# Patient Record
(demographics unavailable — no encounter records)

---

## 2024-12-11 NOTE — HISTORY OF PRESENT ILLNESS
[FreeTextEntry1] : 37 yo  with lmp 24 Patient presented to Urgent Care and +urine preg test   She was referred to ObGyn for further evaluation  2 term  2 termination of pregnancies 1 spontaneous Ab  Patient is AMA and tobacco user   Patient counseled on AMA issues History of asthma  Uses Albuterol off and on  Not currently Encouraged to dc tobacco use  Patient reports history of GDM1 with previous pregnancy  and shortening of cervix (given progesterone suppositories)  Refer to MFM and Genetic Counselor after pregnancy viability is confirmed NOB labs, HCG, and A1c order given US for dates and viability Follow up ~ 2 weeks for NOB visit after US confirms dates and viability     Pap smear and gc/ct at visit   ER warnings given

## 2024-12-11 NOTE — PLAN
[FreeTextEntry1] : NOB labs, A1c, HCG  US for dates/viability  follow up NOB visit after US for pap smear and gc/ct consider 1 hour 50 gram GCT early  Refer to MFM and Genetic Counselor after viability confirmed  ER warnings given

## 2025-01-28 NOTE — OB SUMMARY
[FreeTextEntry1] : 37 y/o  has had 2 prior health pregnancies as detailed below:   Baby #1: Born , full term, NVD, spontaneous labor - born 6 lbs 4 oz 21 inches in length; complicated by GDMA1 managed by diet and exercise; born at [facility]  Baby #2 Born , full term, NVD, spontaneous labor - born 7lbs 1 oz; complicated by GDMA1 managed by diet and exercise; born at [facility]  She has had 2 prior terminations, 1 miscarriages [FreeTextEntry2] : This is a 39 y/o female with PMH asthma, cocaine abuse, prior hx of GDM1 x2, AMA, hx of short cervix presenting for prenatal visit at 12 wk 4 days gestation with dumont pregnancy.   Pt had a normal nuchal translucency test today on sonogram. HEVER is 8/12/2025.  Prenatal labs were complete and reviewed.   #AMA  -Recommended baby aspirin; 1 pill on odd days, 2 pills on even days  #Short Cervix -hx of short cervix in the 3rd trimester requiring progesterone  -plan for 16 week sonogram; will monitor   #History of Gestation GDM1 x2  -plan for early glucose tolerance test at 16 weeks  #Tobacco Use  -current tobacco user -smoking cessations aids offered/counseled; pt declined today  -risks to fetus discussed  #Asthma  -controlled  -not currently using any medications   #Cocaine Use  -active CPS case  -pt is currently in inpatient treatment  -denies any current or recent illicit substance use